# Patient Record
Sex: FEMALE | Race: WHITE | NOT HISPANIC OR LATINO | URBAN - METROPOLITAN AREA
[De-identification: names, ages, dates, MRNs, and addresses within clinical notes are randomized per-mention and may not be internally consistent; named-entity substitution may affect disease eponyms.]

---

## 2017-04-07 ENCOUNTER — TELEPHONE (OUTPATIENT)
Dept: OTHER | Facility: CLINIC | Age: 57
End: 2017-04-07

## 2017-04-07 NOTE — TELEPHONE ENCOUNTER
4/7/2017    Call Regarding Onboarding are Choices     Attempt 1    Message No answer    Comments: DEP- 1      Outreach   CC

## 2017-04-12 NOTE — TELEPHONE ENCOUNTER
Call Regarding Onboarding ARE CHOICES    Attempt 1    Message with spouse    Comments:       Outreach   Denisse Elias

## 2017-07-29 ENCOUNTER — HEALTH MAINTENANCE LETTER (OUTPATIENT)
Age: 57
End: 2017-07-29

## 2024-05-09 ENCOUNTER — VIRTUAL VISIT (OUTPATIENT)
Dept: FAMILY MEDICINE | Facility: CLINIC | Age: 64
End: 2024-05-09
Payer: COMMERCIAL

## 2024-05-09 ENCOUNTER — NURSE TRIAGE (OUTPATIENT)
Dept: NURSING | Facility: CLINIC | Age: 64
End: 2024-05-09
Payer: COMMERCIAL

## 2024-05-09 DIAGNOSIS — Z12.11 SCREEN FOR COLON CANCER: ICD-10-CM

## 2024-05-09 DIAGNOSIS — N39.0 ACUTE UTI (URINARY TRACT INFECTION): Primary | ICD-10-CM

## 2024-05-09 DIAGNOSIS — Z12.31 VISIT FOR SCREENING MAMMOGRAM: ICD-10-CM

## 2024-05-09 PROCEDURE — 99203 OFFICE O/P NEW LOW 30 MIN: CPT | Mod: 95 | Performed by: FAMILY MEDICINE

## 2024-05-09 RX ORDER — NITROFURANTOIN 25; 75 MG/1; MG/1
100 CAPSULE ORAL 2 TIMES DAILY
Qty: 10 CAPSULE | Refills: 0 | Status: SHIPPED | OUTPATIENT
Start: 2024-05-09 | End: 2024-05-14

## 2024-05-09 NOTE — PROGRESS NOTES
Lizbeth is a 63 year old who is being evaluated via a billable video visit.    How would you like to obtain your AVS? MyChart  If the video visit is dropped, the invitation should be resent by: Text to cell phone: [phone]  Will anyone else be joining your video visit? No      Acute UTI (urinary tract infection)  Patient new to this provider in clinic.  Does not have an establish primary care doctor.  She does see any doctors in years.  She reports having had UTI symptoms a year ago and was treated had resolved symptoms.  No labs or urine sample was obtained at that point.  She has several collections that improved with antibiotics.  She denies any fever or abdominal pain or back pain..  Agreed to give her antibiotic.  She reports that she does not react well to penicillin.  Agrees to take Macrobid.  Increase fluids frequent bladder emptying.  UTI bladder hygiene reviewed.  Follow-up if not better in person.  - nitroFURantoin macrocrystal-monohydrate (MACROBID) 100 MG capsule; Take 1 capsule (100 mg) by mouth 2 times daily for 5 days  Dispense: 10 capsule; Refill: 0    Strongly advise establishing care with a primary care provider and doing her wellness visit./Preventative care.    Subjective   Lizbeth is a 63 year old, presenting for the following health issues:  UTI    History of Present Illness       Reason for visit:  UTI  Symptom onset:  1-3 days ago  Symptoms include:  Burning sensation when urinating  Symptom intensity:  Mild  Symptom progression:  Staying the same  Had these symptoms before:  Yes      She hernandez not have a doctor and does not go to doctor at all  She does not really want  No irration/lesion  No blood in urine    Last summer UTI and felt similar  Medication improved it  No abdominal pain, no fever  No           Review of Systems  Constitutional, HEENT, cardiovascular, pulmonary, GI, , musculoskeletal, neuro, skin, endocrine and psych systems are negative, except as otherwise noted.       Objective           Vitals:  No vitals were obtained today due to virtual visit.    Physical Exam   GENERAL: alert and no distress  EYES: Eyes grossly normal to inspection.  No discharge or erythema, or obvious scleral/conjunctival abnormalities.  RESP: No audible wheeze, cough, or visible cyanosis.    SKIN: Visible skin clear. No significant rash, abnormal pigmentation or lesions.  NEURO: Cranial nerves grossly intact.  Mentation and speech appropriate for age.  PSYCH: Appropriate affect, tone, and pace of words      Video-Visit Details    Type of service:  Video Visit   Originating Location (pt. Location): Home    Distant Location (provider location):  On-site  Platform used for Video Visit: Henry  Signed Electronically by: Marie Gallego DO

## 2024-05-09 NOTE — TELEPHONE ENCOUNTER
Patient calling. She's noticed for the past 2-3 days that she has a burning sensation with urination. She had a UTI last summer, and it feels about the same. No hematuria. Afebrile. She feels that she empties her bladder, no frequency.     Care advice given for patient to be seen within 24 hours. Patient states a preference for a virtual urgent care visit. The call was transferred to scheduling to assist with this.     Dona Chilel RN  [facility]  May 9, 2024, 5:28 AM      Reason for Disposition   Age > 50 years    Additional Information   Negative: Shock suspected (e.g., cold/pale/clammy skin, too weak to stand, low BP, rapid pulse)   Negative: Sounds like a life-threatening emergency to the triager   Negative: Followed a genital area injury (e.g., vagina, vulva)   Negative: Taking antibiotic for urinary tract infection (UTI)   Negative: Pregnant   Negative: Postpartum (from 0 to 6 weeks after delivery)   Negative: [1] Unable to urinate (or only a few drops) > 4 hours AND [2] bladder feels very full (e.g., palpable bladder or strong urge to urinate)   Negative: Vomiting   Negative: Patient sounds very sick or weak to the triager   Negative: [1] SEVERE pain with urination (e.g., excruciating) AND [2] not improved after 2 hours of pain medicine and Sitz bath   Negative: Fever > 100.4 F (38.0 C)   Negative: Side (flank) or lower back pain present   Negative: Diabetes mellitus or weak immune system (e.g., HIV positive, cancer chemo, splenectomy, organ transplant, chronic steroids)   Negative: Bedridden (e.g., CVA, chronic illness, recovering from surgery)   Negative: Artificial heart valve or artificial joint   Negative: Unusual vaginal discharge (e.g., bad smelling, yellow, green, or foamy-white)   Negative: Patient is worried they have a sexually transmitted infection (STI)   Negative: Possibility of pregnancy   Negative: Blood in urine (red, pink, or tea-colored)    Protocols used: Urination Pain -  Female-A-AH

## 2024-06-22 ENCOUNTER — HEALTH MAINTENANCE LETTER (OUTPATIENT)
Age: 64
End: 2024-06-22

## 2024-07-31 ENCOUNTER — OFFICE VISIT (OUTPATIENT)
Dept: URGENT CARE | Facility: URGENT CARE | Age: 64
End: 2024-07-31
Payer: COMMERCIAL

## 2024-07-31 VITALS
WEIGHT: 265 LBS | DIASTOLIC BLOOD PRESSURE: 105 MMHG | TEMPERATURE: 98.1 F | SYSTOLIC BLOOD PRESSURE: 150 MMHG | OXYGEN SATURATION: 100 % | RESPIRATION RATE: 16 BRPM | HEART RATE: 86 BPM

## 2024-07-31 DIAGNOSIS — N76.0 BV (BACTERIAL VAGINOSIS): ICD-10-CM

## 2024-07-31 DIAGNOSIS — B96.89 BV (BACTERIAL VAGINOSIS): ICD-10-CM

## 2024-07-31 DIAGNOSIS — N30.00 ACUTE CYSTITIS WITHOUT HEMATURIA: ICD-10-CM

## 2024-07-31 DIAGNOSIS — N30.00 ACUTE CYSTITIS WITHOUT HEMATURIA: Primary | ICD-10-CM

## 2024-07-31 DIAGNOSIS — R30.0 DYSURIA: ICD-10-CM

## 2024-07-31 LAB
ALBUMIN UR-MCNC: >=300 MG/DL
APPEARANCE UR: ABNORMAL
BACTERIA #/AREA URNS HPF: ABNORMAL /HPF
BILIRUB UR QL STRIP: NEGATIVE
CLUE CELLS: PRESENT
COLOR UR AUTO: YELLOW
GLUCOSE UR STRIP-MCNC: NEGATIVE MG/DL
HGB UR QL STRIP: ABNORMAL
KETONES UR STRIP-MCNC: ABNORMAL MG/DL
LEUKOCYTE ESTERASE UR QL STRIP: ABNORMAL
NITRATE UR QL: NEGATIVE
PH UR STRIP: 6 [PH] (ref 5–7)
RBC #/AREA URNS AUTO: ABNORMAL /HPF
SP GR UR STRIP: >=1.03 (ref 1–1.03)
SQUAMOUS #/AREA URNS AUTO: ABNORMAL /LPF
TRICHOMONAS, WET PREP: ABNORMAL
UROBILINOGEN UR STRIP-ACNC: 0.2 E.U./DL
WBC #/AREA URNS AUTO: >100 /HPF
WBC CLUMPS #/AREA URNS HPF: PRESENT /HPF
WBC'S/HIGH POWER FIELD, WET PREP: ABNORMAL
YEAST, WET PREP: ABNORMAL

## 2024-07-31 PROCEDURE — 87086 URINE CULTURE/COLONY COUNT: CPT | Performed by: STUDENT IN AN ORGANIZED HEALTH CARE EDUCATION/TRAINING PROGRAM

## 2024-07-31 PROCEDURE — 99213 OFFICE O/P EST LOW 20 MIN: CPT | Performed by: STUDENT IN AN ORGANIZED HEALTH CARE EDUCATION/TRAINING PROGRAM

## 2024-07-31 PROCEDURE — 81001 URINALYSIS AUTO W/SCOPE: CPT | Performed by: STUDENT IN AN ORGANIZED HEALTH CARE EDUCATION/TRAINING PROGRAM

## 2024-07-31 PROCEDURE — 87210 SMEAR WET MOUNT SALINE/INK: CPT | Performed by: STUDENT IN AN ORGANIZED HEALTH CARE EDUCATION/TRAINING PROGRAM

## 2024-07-31 RX ORDER — PHENAZOPYRIDINE HYDROCHLORIDE 100 MG/1
100 TABLET, FILM COATED ORAL 3 TIMES DAILY PRN
Qty: 6 TABLET | Refills: 0 | Status: SHIPPED | OUTPATIENT
Start: 2024-07-31

## 2024-07-31 RX ORDER — NITROFURANTOIN 25; 75 MG/1; MG/1
CAPSULE ORAL
Qty: 10 CAPSULE | Refills: 0 | OUTPATIENT
Start: 2024-07-31

## 2024-07-31 RX ORDER — METRONIDAZOLE 500 MG/1
TABLET ORAL
Qty: 14 TABLET | Refills: 0 | OUTPATIENT
Start: 2024-07-31

## 2024-07-31 RX ORDER — NITROFURANTOIN 25; 75 MG/1; MG/1
100 CAPSULE ORAL 2 TIMES DAILY
Qty: 10 CAPSULE | Refills: 0 | Status: SHIPPED | OUTPATIENT
Start: 2024-07-31 | End: 2024-08-05

## 2024-07-31 RX ORDER — METRONIDAZOLE 500 MG/1
500 TABLET ORAL 2 TIMES DAILY
Qty: 14 TABLET | Refills: 0 | Status: SHIPPED | OUTPATIENT
Start: 2024-07-31 | End: 2024-08-07

## 2024-07-31 NOTE — PATIENT INSTRUCTIONS
Macrobid for UTI.  Urine culture is pending - you will be notified if we need to change medication.    Metronidazole for BV.  You currently have a vaginal infection called bacterial vaginosis, or BV for short.   This is not a sexually transmitted infection and it cannot be transmitted to your partner.  BV typically occurs due to a change in pH balance of the vagina. The following things may cause BV to occur: douching, new soaps or lubricants, or oral sex. Sometimes we can't prevent BV because it can happen for no reason at all.   Sometimes BV is asymptomatic, but classically it causes thin or creamy odorous vaginal discharge. It can also cause some low abdominal discomfort.     Today we will treat this infection with an antibiotic called Metronidazole (Flagyl). Take this medication twice daily for 7 days. You must avoid drinking alcohol while you are taking this medication. If you drink while taking Flagyl you may experience severe headache, nausea/vomiting, or liver dysfunction.  Avoid sexual intercourse until treatment is completed.  Follow up if you continue to have symptoms after you have completed your treatment.

## 2024-07-31 NOTE — TELEPHONE ENCOUNTER
Medication Question or Refill    Contacts       Contact Date/Time Type Contact Phone/Fax    07/31/2024 03:52 PM CDT Interface (Incoming) [id number] [address] AT [facility] OF [facility] [address] [phone]    07/31/2024 03:59 PM CDT Phone (Incoming) Lizbeth Moody (Self) [phone] (H)            What medication are you calling about (include dose and sig)?: phenazopyridine (PYRIDIUM) 100 MG tablet    Preferred Pharmacy:       [facility] #[id number] - [facility] [address] AT [address] [facility] [address]  Phone: [phone] Fax: [phone]      Controlled Substance Agreement on file:   CSA -- Patient Level:    CSA: None found at the patient level.       Who prescribed the medication?: Nini Bhatti PA-C    Do you need a refill? No, pt was seen today.    When did you use the medication last? N/A    Patient offered an appointment? No    Do you have any questions or concerns?  No      Could we send this information to you in [facility] or would you prefer to receive a phone call?:   Patient would prefer a phone call   Okay to leave a detailed message?: Yes at Cell number on file:    Telephone Information:   Mobile [phone]

## 2024-07-31 NOTE — PROGRESS NOTES
ASSESSMENT & PLAN:   Diagnoses and all orders for this visit:  Acute cystitis without hematuria  -     nitroFURantoin macrocrystal-monohydrate (MACROBID) 100 MG capsule; Take 1 capsule (100 mg) by mouth 2 times daily for 5 days  -     phenazopyridine (PYRIDIUM) 100 MG tablet; Take 1 tablet (100 mg) by mouth 3 times daily as needed for urinary tract discomfort or pain  BV (bacterial vaginosis)  -     metroNIDAZOLE (FLAGYL) 500 MG tablet; Take 1 tablet (500 mg) by mouth 2 times daily for 7 days  Dysuria  -     UA Macroscopic with reflex to Microscopic and Culture - Clinic Collect  -     Wet prep - lab collect; Future  -     UA Microscopic with Reflex to Culture  -     Urine Culture    UTI symptoms x 2 days. UA positive for UTI with pyuria and leukocytes- Macrobid x 5 days. Urine culture pending. Wet prep positive for BV - metronidazole x 7 days.     At the end of the encounter, I discussed results, diagnosis, medications. Discussed red flags for immediate return to clinic/ER, as well as indications for follow up if no improvement. Patient and/or caregiver understood and agreed to plan. Patient was stable for discharge.    Patient Instructions   Macrobid for UTI.  Urine culture is pending - you will be notified if we need to change medication.    Metronidazole for BV.  You currently have a vaginal infection called bacterial vaginosis, or BV for short.   This is not a sexually transmitted infection and it cannot be transmitted to your partner.  BV typically occurs due to a change in pH balance of the vagina. The following things may cause BV to occur: douching, new soaps or lubricants, or oral sex. Sometimes we can't prevent BV because it can happen for no reason at all.   Sometimes BV is asymptomatic, but classically it causes thin or creamy odorous vaginal discharge. It can also cause some low abdominal discomfort.     Today we will treat this infection with an antibiotic called Metronidazole (Flagyl). Take this  medication twice daily for 7 days. You must avoid drinking alcohol while you are taking this medication. If you drink while taking Flagyl you may experience severe headache, nausea/vomiting, or liver dysfunction.  Avoid sexual intercourse until treatment is completed.  Follow up if you continue to have symptoms after you have completed your treatment.      No follow-ups on file.    ------------------------------------------------------------------------  SUBJECTIVE  History was obtained from patient.    Patient presents with:  UTI: Onset: 7/27: Pt has suspected UTI sharp/throbbing pain, burning, frequency, urgency.     HPI  Lizbeth Moody is a(n) 63 year old female presenting to urgent care for UTI symptoms x 2 days. Worsening since onset. Reports dysuria, frequency, urgency. Denies vaginal symptoms including discharge, itching, pain.     Review of Systems    Current Outpatient Medications   Medication Sig Dispense Refill    metroNIDAZOLE (FLAGYL) 500 MG tablet Take 1 tablet (500 mg) by mouth 2 times daily for 7 days 14 tablet 0    nitroFURantoin macrocrystal-monohydrate (MACROBID) 100 MG capsule Take 1 capsule (100 mg) by mouth 2 times daily for 5 days 10 capsule 0    phenazopyridine (PYRIDIUM) 100 MG tablet Take 1 tablet (100 mg) by mouth 3 times daily as needed for urinary tract discomfort or pain 6 tablet 0     Problem List:  There are no relevant problems documented for this patient.    Allergies   Allergen Reactions    Hydrocortisone Swelling    Penicillins Hives         OBJECTIVE  Vitals:    07/31/24 1212   BP: (!) 150/105   BP Location: Left arm   Patient Position: Sitting   Cuff Size: Adult Regular   Pulse: 86   Resp: 16   Temp: 98.1  F (36.7  C)   TempSrc: Tympanic   SpO2: 100%   Weight: 120.2 kg (265 lb)     Physical Exam   GENERAL: healthy, alert, no acute distress.   PSYCH: mentation appears normal. Normal affect    Results for orders placed or performed in visit on 07/31/24   UA Macroscopic  with reflex to Microscopic and Culture - Clinic Collect     Status: Abnormal    Specimen: Urine, Clean Catch   Result Value Ref Range    Color Urine Yellow Colorless, Straw, Light Yellow, Yellow    Appearance Urine Slightly Cloudy (A) Clear    Glucose Urine Negative Negative mg/dL    Bilirubin Urine Negative Negative    Ketones Urine Trace (A) Negative mg/dL    Specific Gravity Urine >=1.030 1.003 - 1.035    Blood Urine Large (A) Negative    pH Urine 6.0 5.0 - 7.0    Protein Albumin Urine >=300 (A) Negative mg/dL    Urobilinogen Urine 0.2 0.2, 1.0 E.U./dL    Nitrite Urine Negative Negative    Leukocyte Esterase Urine Trace (A) Negative   UA Microscopic with Reflex to Culture     Status: Abnormal   Result Value Ref Range    Bacteria Urine Many (A) None Seen /HPF    RBC Urine 25-50 (A) 0-2 /HPF /HPF    WBC Urine >100 (A) 0-5 /HPF /HPF    Squamous Epithelials Urine Many (A) None Seen /LPF    WBC Clumps Urine Present (A) None Seen /HPF   Wet prep - lab collect     Status: Abnormal    Specimen: Vagina; Swab   Result Value Ref Range    Trichomonas Absent Absent    Yeast Absent Absent    Clue Cells Present (A) Absent    WBCs/high power field 1+ (A) None

## 2024-08-02 LAB — BACTERIA UR CULT: NORMAL

## 2024-11-09 ENCOUNTER — HEALTH MAINTENANCE LETTER (OUTPATIENT)
Age: 64
End: 2024-11-09

## 2024-12-12 ENCOUNTER — VIRTUAL VISIT (OUTPATIENT)
Dept: FAMILY MEDICINE | Facility: CLINIC | Age: 64
End: 2024-12-12
Payer: COMMERCIAL

## 2024-12-12 DIAGNOSIS — N39.0 URINARY TRACT INFECTION WITHOUT HEMATURIA, SITE UNSPECIFIED: Primary | ICD-10-CM

## 2024-12-12 RX ORDER — NITROFURANTOIN 25; 75 MG/1; MG/1
100 CAPSULE ORAL 2 TIMES DAILY
Qty: 10 CAPSULE | Refills: 0 | Status: SHIPPED | OUTPATIENT
Start: 2024-12-12 | End: 2024-12-17

## 2024-12-12 NOTE — PROGRESS NOTES
Lizbeth is a 64 year old who is being evaluated via a billable video visit.    How would you like to obtain your AVS? MyChart  If the video visit is dropped, the invitation should be resent by: Text to cell phone: [phone]  Will anyone else be joining your video visit? No      Assessment & Plan     Urinary tract infection without hematuria, site unspecified  Declined urine sample to be dropped off as she has transportation problems.  Patient has had 3 visits now for UTI, and is strongly advised to not do virtual visits any more for UTI. She should come in person and get labs and urine..  Does not have an establish primary care doctor.  She reports of feeling better with antibiotics .  She denies any fever or abdominal pain or back pain..  Agreed to give her antibiotic.  She reports that she does not react well to penicillin.  Agrees to take Macrobid.  Increase fluids frequent bladder emptying.   - nitroFURantoin macrocrystal-monohydrate (MACROBID) 100 MG capsule; Take 1 capsule (100 mg) by mouth 2 times daily for 5 days.          Nicotine/Tobacco Cessation  She reports that she has been smoking cigarettes. She does not have any smokeless tobacco history on file.  Nicotine/Tobacco Cessation Plan  Information offered: Patient not interested at this time        See Patient Instructions    Subjective   Lizbeth is a 64 year old, presenting for the following health issues:  UTI      12/12/2024     8:01 AM   Additional Questions   Roomed by Kathie     UTI    History of Present Illness       Reason for visit:  Uti  Symptom onset:  1-3 days ago  Symptoms include:  Pain when uterinating can hold any unless painful  Symptom intensity:  Severe  Symptom progression:  Staying the same  Had these symptoms before:  Yes  Has tried/received treatment for these symptoms:  Yes  Previous treatment was successful:  Yes  Prior treatment description:  Medication as prescribed  What makes it worse:  No  What makes it better:  No    She is taking medications regularly.           Review of Systems  Constitutional, HEENT, cardiovascular, pulmonary, GI, , musculoskeletal, neuro, skin, endocrine and psych systems are negative, except as otherwise noted.      Objective           Vitals:  No vitals were obtained today due to virtual visit.    Physical Exam   GENERAL: alert and no distress  EYES: Eyes grossly normal to inspection.  No discharge or erythema, or obvious scleral/conjunctival abnormalities.  RESP: No audible wheeze, cough, or visible cyanosis.    SKIN: Visible skin clear. No significant rash, abnormal pigmentation or lesions.  NEURO: Cranial nerves grossly intact.  Mentation and speech appropriate for age.  PSYCH: Appropriate affect, tone, and pace of words          Video-Visit Details    Type of service:  Video Visit   Originating Location (pt. Location): Home    Distant Location (provider location):  On-site  Platform used for Video Visit: Henry  Signed Electronically by: Marie Gallego DO

## 2024-12-16 ENCOUNTER — OFFICE VISIT (OUTPATIENT)
Dept: FAMILY MEDICINE | Facility: CLINIC | Age: 64
End: 2024-12-16
Payer: COMMERCIAL

## 2024-12-16 VITALS
HEIGHT: 65 IN | DIASTOLIC BLOOD PRESSURE: 84 MMHG | TEMPERATURE: 97.3 F | SYSTOLIC BLOOD PRESSURE: 133 MMHG | OXYGEN SATURATION: 97 % | HEART RATE: 83 BPM | WEIGHT: 269.13 LBS | RESPIRATION RATE: 20 BRPM | BODY MASS INDEX: 44.84 KG/M2

## 2024-12-16 DIAGNOSIS — R30.0 DYSURIA: ICD-10-CM

## 2024-12-16 DIAGNOSIS — N39.0 ACUTE UTI (URINARY TRACT INFECTION): Primary | ICD-10-CM

## 2024-12-16 LAB
ALBUMIN UR-MCNC: 100 MG/DL
APPEARANCE UR: ABNORMAL
BACTERIA #/AREA URNS HPF: ABNORMAL /HPF
BILIRUB UR QL STRIP: ABNORMAL
COLOR UR AUTO: ABNORMAL
GLUCOSE UR STRIP-MCNC: NEGATIVE MG/DL
HGB UR QL STRIP: ABNORMAL
KETONES UR STRIP-MCNC: ABNORMAL MG/DL
LEUKOCYTE ESTERASE UR QL STRIP: ABNORMAL
NITRATE UR QL: NEGATIVE
PH UR STRIP: 5.5 [PH] (ref 5–7)
RBC #/AREA URNS AUTO: ABNORMAL /HPF
SP GR UR STRIP: >=1.03 (ref 1–1.03)
SQUAMOUS #/AREA URNS AUTO: ABNORMAL /LPF
UROBILINOGEN UR STRIP-ACNC: 0.2 E.U./DL
WBC #/AREA URNS AUTO: ABNORMAL /HPF

## 2024-12-16 PROCEDURE — 99213 OFFICE O/P EST LOW 20 MIN: CPT | Performed by: PHYSICIAN ASSISTANT

## 2024-12-16 PROCEDURE — 87086 URINE CULTURE/COLONY COUNT: CPT | Performed by: PHYSICIAN ASSISTANT

## 2024-12-16 PROCEDURE — 81001 URINALYSIS AUTO W/SCOPE: CPT | Performed by: PHYSICIAN ASSISTANT

## 2024-12-16 RX ORDER — CIPROFLOXACIN 250 MG/1
250 TABLET, FILM COATED ORAL 2 TIMES DAILY
Qty: 10 TABLET | Refills: 0 | Status: SHIPPED | OUTPATIENT
Start: 2024-12-16 | End: 2024-12-21

## 2024-12-16 RX ORDER — PHENAZOPYRIDINE HYDROCHLORIDE 100 MG/1
100 TABLET, FILM COATED ORAL 3 TIMES DAILY PRN
Qty: 6 TABLET | Refills: 0 | Status: SHIPPED | OUTPATIENT
Start: 2024-12-16

## 2024-12-16 RX ORDER — PHENAZOPYRIDINE HYDROCHLORIDE 100 MG/1
100 TABLET, FILM COATED ORAL 3 TIMES DAILY PRN
Qty: 6 TABLET | Refills: 0 | Status: SHIPPED | OUTPATIENT
Start: 2024-12-16 | End: 2024-12-16

## 2024-12-16 ASSESSMENT — PAIN SCALES - GENERAL: PAINLEVEL_OUTOF10: SEVERE PAIN (7)

## 2024-12-16 NOTE — PATIENT INSTRUCTIONS
Urinary Tract Infection (UTI) in Women: Care Instructions  Overview     A urinary tract infection (UTI) is an infection caused by bacteria. It can happen anywhere in the urinary tract. A UTI can happen in the:  Kidneys.  Ureters, the tubes that connect the kidneys to the bladder.  Bladder.  Urethra, where the urine comes out.  Most UTIs are bladder infections. They often cause pain or burning when you urinate.  Most UTIs can be cured with antibiotics. If you are prescribed antibiotics, be sure to complete your treatment so that the infection does not get worse.  Follow-up care is a key part of your treatment and safety. Be sure to make and go to all appointments, and call your doctor if you are having problems. It's also a good idea to know your test results and keep a list of the medicines you take.  How can you care for yourself at home?  Take your antibiotics as directed. Do not stop taking them just because you feel better. You need to take the full course of antibiotics.  Drink extra water and other fluids for the next day or two. This will help make the urine less concentrated and help wash out the bacteria that are causing the infection. (If you have kidney, heart, or liver disease and have to limit fluids, talk with your doctor before you increase the amount of fluids you drink.)  Avoid drinks that are carbonated or have caffeine. They can irritate the bladder.  Urinate often. Try to empty your bladder each time.  To relieve pain, take a hot bath or lay a heating pad set on low over your lower belly or genital area. Never go to sleep with a heating pad in place.  To prevent UTIs  Drink plenty of water each day. This helps you urinate often, which clears bacteria from your system. (If you have kidney, heart, or liver disease and have to limit fluids, talk with your doctor before you increase the amount of fluids you drink.)  Urinate when you need to.  If you are sexually active, urinate right after you have  "sex.  Change sanitary pads often.  Avoid douches, bubble baths, feminine hygiene sprays, and other feminine hygiene products that have deodorants.  After going to the bathroom, wipe from front to back.  When should you call for help?   Call your doctor now or seek immediate medical care if:    You have new or worse fever, chills, nausea, or vomiting.     You have new pain in your back just below your rib cage. This is called flank pain.     There is new blood or pus in your urine.     You have any problems with your antibiotic medicine.   Watch closely for changes in your health, and be sure to contact your doctor if:    You are not getting better after taking an antibiotic for 2 days.     Your symptoms go away but then come back.   Where can you learn more?  Go to https://www.[employer].net/patiented  Enter K848 in the search box to learn more about \"Urinary Tract Infection (UTI) in Women: Care Instructions.\"  Current as of: November 15, 2023  Content Version: 14.2 2024 [facility] [employer].   Care instructions adapted under license by your healthcare professional. If you have questions about a medical condition or this instruction, always ask your healthcare professional. [employer] disclaims any warranty or liability for your use of this information.    "

## 2024-12-16 NOTE — PROGRESS NOTES
"  Assessment & Plan     Acute UTI (urinary tract infection)  Urinalysis shows UTI. Antibiotics recommended to treat. Due to no improvement with Macrobid will switch antibiotic to Ciprofloxacin. Side effects discussed. In addition I recommend increase fluids, otc pain relievers as needed or AZO PRN. Culture is pending. Follow up in clinic if symptoms are not improving. Patient agree's with this plan and has no further questions   - ciprofloxacin (CIPRO) 250 MG tablet; Take 1 tablet (250 mg) by mouth 2 times daily for 5 days.    Dysuria  - UA with Microscopic reflex to Culture - lab collect; Future  - UA with Microscopic reflex to Culture - lab collect  - UA Microscopic with Reflex to Culture  - Urine Culture  - phenazopyridine (PYRIDIUM) 100 MG tablet; Take 1 tablet (100 mg) by mouth 3 times daily as needed for urinary tract discomfort or pain.          BMI  Estimated body mass index is 44.62 kg/m  as calculated from the following:    Height as of this encounter: 1.654 m (5' 5.12\").    Weight as of this encounter: 122.1 kg (269 lb 2 oz).             Subjective   Lizbeth is a 64 year old, presenting for the following health issues:  UTI    History of Present Illness       Reason for visit:  Uti  Symptom onset:  1-3 days ago  Symptoms include:  Pain when uterinating can hold any unless painful  Symptom intensity:  Severe  Symptom progression:  Staying the same  Had these symptoms before:  Yes  Has tried/received treatment for these symptoms:  Yes  Previous treatment was successful:  Yes  Prior treatment description:  Medication as prescribed  What makes it worse:  No  What makes it better:  No   She is taking medications regularly.           Genitourinary - Female  Onset/Duration: 7 days  Description:   Painful urination (Dysuria): YES - burning            Frequency: YES  Blood in urine (Hematuria): No  Delay in urine (Hesitency): No  Intensity: moderate  Progression of Symptoms:  worsening  Accompanying Signs & " "Symptoms:  Fever/chills: No  Flank pain: No  Nausea and vomiting: No  Vaginal symptoms: none  Abdominal/Pelvic Pain: No  History:   History of frequent UTI s: YES- she has gotten about 4 UTI's this year  History of kidney stones: No  Sexually Active: YES - not concerned for STD's  Possibility of pregnancy: No  Precipitating or alleviating factors: None  Therapies tried and outcome was placed on antibiotics Macrobid for 4 days but not working, Aleve       Review of Systems  Constitutional, HEENT, cardiovascular, pulmonary, gi and gu systems are negative, except as otherwise noted.      Objective    /84   Pulse 83   Temp 97.3  F (36.3  C) (Oral)   Resp 20   Ht 1.654 m (5' 5.12\")   Wt 122.1 kg (269 lb 2 oz)   LMP  (LMP Unknown)   SpO2 97%   BMI 44.62 kg/m    Body mass index is 44.62 kg/m .  Physical Exam  Constitutional:       Appearance: Normal appearance.   HENT:      Head: Normocephalic.   Cardiovascular:      Rate and Rhythm: Normal rate and regular rhythm.   Pulmonary:      Effort: Pulmonary effort is normal.      Breath sounds: Normal breath sounds.   Abdominal:      General: Bowel sounds are normal.      Palpations: Abdomen is soft.      Tenderness: There is no abdominal tenderness. There is no right CVA tenderness or left CVA tenderness.   Neurological:      Mental Status: She is alert.   Psychiatric:         Mood and Affect: Mood normal.            Results for orders placed or performed in visit on 12/16/24   UA with Microscopic reflex to Culture - lab collect     Status: Abnormal    Specimen: Urine, Midstream   Result Value Ref Range    Color Urine Dona (A) Colorless, Straw, Light Yellow, Yellow    Appearance Urine Cloudy (A) Clear    Glucose Urine Negative Negative mg/dL    Bilirubin Urine Small (A) Negative    Ketones Urine Trace (A) Negative mg/dL    Specific Gravity Urine >=1.030 1.003 - 1.035    Blood Urine Moderate (A) Negative    pH Urine 5.5 5.0 - 7.0    Protein Albumin Urine 100 (A) " Negative mg/dL    Urobilinogen Urine 0.2 0.2, 1.0 E.U./dL    Nitrite Urine Negative Negative    Leukocyte Esterase Urine Small (A) Negative   UA Microscopic with Reflex to Culture     Status: Abnormal   Result Value Ref Range    Bacteria Urine Moderate (A) None Seen /HPF    RBC Urine 10-25 (A) 0-2 /HPF /HPF    WBC Urine 25-50 (A) 0-5 /HPF /HPF    Squamous Epithelials Urine Moderate (A) None Seen /LPF           Signed Electronically by: NISHANT Vail

## 2024-12-17 ENCOUNTER — NURSE TRIAGE (OUTPATIENT)
Dept: NURSING | Facility: CLINIC | Age: 64
End: 2024-12-17
Payer: COMMERCIAL

## 2024-12-17 ENCOUNTER — TELEPHONE (OUTPATIENT)
Dept: FAMILY MEDICINE | Facility: CLINIC | Age: 64
End: 2024-12-17
Payer: COMMERCIAL

## 2024-12-17 DIAGNOSIS — R30.0 DYSURIA: Primary | ICD-10-CM

## 2024-12-17 LAB — BACTERIA UR CULT: NORMAL

## 2024-12-17 NOTE — TELEPHONE ENCOUNTER
Called and left message for patient to return call to clinic    NORMA Elias    Triage Nurse  [facility]        ----- Message from Karyna Del Castillo sent at 12/17/2024  2:54 PM CST -----  Please call patient and inform her that her urine culture did not come back positive for bacteria. Meaning this is not a UTI. However, I would complete the full course of antibiotics but we need to keep a close on eye on her urine and symptoms. We need to recheck urine in 2 weeks to ensure urine goes back to normal.   Please make follow up appointment in 2 weeks to recheck     Thank you,  Karyna Del Castillo PA-C

## 2024-12-18 NOTE — TELEPHONE ENCOUNTER
Message to call a triage nurse. Seen in office yesterday am and tests were done for UTI.   Message from provider was read to patient.   She needs follow up appointment in 2 weeks to recheck her urine--call transferred to .     Andree Barrow RN Triage Nurse Advisor 7:09 PM 12/17/2024  Reason for Disposition   Lab result questions   [1] Other NON-URGENT information for PCP AND [2] does not require PCP response    Additional Information   Negative: Lab calling with strep throat test results and triager can call in prescription   Negative: Lab calling with urinalysis test results and triager can call in prescription   Negative: Medication questions   Negative: Medication renewal and refill questions   Negative: Pre-operative or pre-procedural questions   Negative: ED call to PCP (i.e., primary care provider; doctor, NP, or PA)   Negative: Doctor (or NP/PA) call to PCP   Negative: Call about patient who is currently hospitalized   Negative: Lab or radiology calling with CRITICAL test results   Negative: [1] Follow-up call from patient regarding patient's clinical status AND [2] information urgent   Negative: [1] Caller requests to speak ONLY to PCP AND [2] URGENT question   Negative: [1] Caller requests to speak to PCP now AND [2] won't tell us reason for call  (Exception: If 10 pm to 6 am, caller must first discuss reason for the call.)   Negative: Notification of hospital admission   Negative: Notification of death   Negative: Caller requesting lab results  (Exception: Routine or non-urgent lab result.)   Negative: Lab or radiology calling with test results   Negative: [1] Follow-up call from patient regarding patient's clinical status AND [2] information NON-URGENT   Negative: [1] Caller requests to speak ONLY to PCP AND [2] NON-URGENT question   Negative: Caller requesting an appointment, triage offered and declined   Negative: Caller requesting routine or non-urgent lab result    Protocols used:  Information Only Call - No Triage-A-AH, PCP Call - No Triage-A-AH

## 2025-04-09 ENCOUNTER — LAB (OUTPATIENT)
Dept: LAB | Facility: CLINIC | Age: 65
End: 2025-04-09

## 2025-04-09 ENCOUNTER — VIRTUAL VISIT (OUTPATIENT)
Dept: FAMILY MEDICINE | Facility: CLINIC | Age: 65
End: 2025-04-09

## 2025-04-09 DIAGNOSIS — Z12.11 SCREEN FOR COLON CANCER: ICD-10-CM

## 2025-04-09 DIAGNOSIS — Z12.31 VISIT FOR SCREENING MAMMOGRAM: ICD-10-CM

## 2025-04-09 DIAGNOSIS — Z78.0 ASYMPTOMATIC POSTMENOPAUSAL STATUS: Primary | ICD-10-CM

## 2025-04-09 DIAGNOSIS — R39.9 URINARY SYMPTOM OR SIGN: Primary | ICD-10-CM

## 2025-04-09 DIAGNOSIS — N30.00 ACUTE CYSTITIS WITHOUT HEMATURIA: ICD-10-CM

## 2025-04-09 DIAGNOSIS — R39.9 URINARY SYMPTOM OR SIGN: ICD-10-CM

## 2025-04-09 LAB
BACTERIA #/AREA URNS HPF: ABNORMAL /HPF
RBC #/AREA URNS AUTO: ABNORMAL /HPF
SQUAMOUS #/AREA URNS AUTO: ABNORMAL /LPF
WBC #/AREA URNS AUTO: >100 /HPF

## 2025-04-09 PROCEDURE — 98005 SYNCH AUDIO-VIDEO EST LOW 20: CPT | Performed by: FAMILY MEDICINE

## 2025-04-09 PROCEDURE — 87186 SC STD MICRODIL/AGAR DIL: CPT

## 2025-04-09 PROCEDURE — 81015 MICROSCOPIC EXAM OF URINE: CPT

## 2025-04-09 PROCEDURE — 87086 URINE CULTURE/COLONY COUNT: CPT

## 2025-04-09 RX ORDER — TRAMADOL HYDROCHLORIDE 50 MG/1
50 TABLET ORAL EVERY 6 HOURS PRN
Qty: 6 TABLET | Refills: 0 | Status: SHIPPED | OUTPATIENT
Start: 2025-04-09 | End: 2025-04-12

## 2025-04-09 RX ORDER — NITROFURANTOIN 25; 75 MG/1; MG/1
100 CAPSULE ORAL 2 TIMES DAILY
Qty: 20 CAPSULE | Refills: 0 | Status: SHIPPED | OUTPATIENT
Start: 2025-04-09 | End: 2025-04-19

## 2025-04-09 NOTE — PROGRESS NOTES
"Lizbeth is a 64 year old who is being evaluated via a billable video visit.    How would you like to obtain your AVS? MyChart  If the video visit is dropped, the invitation should be resent by: Send to e-mail at: [email or web address].  Can do cell to cell if needed.  Cell is [phone].  Will anyone else be joining your video visit? No      Assessment & Plan   Problem List Items Addressed This Visit    None  Visit Diagnoses       Asymptomatic postmenopausal status    -  Primary    Visit for screening mammogram        Screen for colon cancer        Acute cystitis without hematuria        Relevant Medications    nitroFURantoin macrocrystal-monohydrate (MACROBID) 100 MG capsule    traMADol (ULTRAM) 50 MG tablet           UA discussed with patient. Antibiotics faxed. Recommend increase in fluids.        BMI  Estimated body mass index is 44.62 kg/m  as calculated from the following:    Height as of 12/16/24: 1.654 m (5' 5.12\").    Weight as of 12/16/24: 122.1 kg (269 lb 2 oz).       FUTURE APPOINTMENTS:       - Follow-up visit as needed      Subjective   Lizbeth is a 64 year old, presenting for the following health issues:  UTI (Symptoms of a UTI.  Pt did go to the lab to have a UA done prior to the visit.)        4/9/2025     5:28 PM   Additional Questions   Roomed by Laura Dunn CMA     HPI    Patient is a 64 yr old female with urinary symptoms. She reports burning and frequency. She has had UTIS in the past and reports they are becoming more frequent,   In the last year she has had about 4-5 UTIS. She is wondering what she can do to prevent them. Recommend making an in person visit with her primary.   She may have vaginal atrophy or she may need a cystoscopy.     Genitourinary - Female  Onset/Duration: Last Saturday.  Description:   Painful urination (Dysuria): YES           Frequency: YES  Blood in urine (Hematuria): No  Delay in urine (Hesitency): No  Intensity: 9.5/10  Progression of Symptoms:  " worsening  Accompanying Signs & Symptoms:  Fever/chills: No  Flank pain: No  Nausea and vomiting: No  Vaginal symptoms: none  Abdominal/Pelvic Pain: No  History:   History of frequent UTI s: YES  History of kidney stones: No  Sexually Active: YES  Possibility of pregnancy: No  Precipitating or alleviating factors: Hot bath with epsom salt to soak.  That will help some.  Ibuprofen as needed-that can bring the pain to a 6/10.  Therapies tried and outcome: AZO helped some but still present-started that on Monday, took the last one today.  Increased fluids.  Patricia-health-that has some Cranberry in it.          Review of Systems  CONSTITUTIONAL: NEGATIVE for fever, chills, change in weight  INTEGUMENTARY/SKIN: NEGATIVE for worrisome rashes, moles or lesions  ENT/MOUTH: NEGATIVE for ear, mouth and throat problems  RESP: NEGATIVE for significant cough or SOB  CV: NEGATIVE for chest pain, palpitations or peripheral edema  : positive for  dysuria, frequency , and urgency  MUSCULOSKELETAL: NEGATIVE for significant arthralgias or myalgia  NEURO: NEGATIVE for weakness, dizziness or paresthesias  ENDOCRINE: NEGATIVE for temperature intolerance, skin/hair changes  HEME/ALLERGY/IMMUNE: NEGATIVE for bleeding problems      Objective    Vitals - Patient Reported  Pain Score: Severe Pain (9)  Pain Loc: Other - see comment (UTI symptoms)      Vitals:  No vitals were obtained today due to virtual visit.    Physical Exam   GENERAL: alert and no distress  EYES: Eyes grossly normal to inspection.  No discharge or erythema, or obvious scleral/conjunctival abnormalities.  RESP: No audible wheeze, cough, or visible cyanosis.    SKIN: Visible skin clear. No significant rash, abnormal pigmentation or lesions.  NEURO: Cranial nerves grossly intact.  Mentation and speech appropriate for age.  PSYCH: Appropriate affect, tone, and pace of words    Results for orders placed or performed in visit on 04/09/25 (from the past 24 hours)   UA Microscopic  with Reflex to Culture   Result Value Ref Range    Bacteria Urine Few (A) None Seen /HPF    RBC Urine  (A) 0-2 /HPF /HPF    WBC Urine >100 (A) 0-5 /HPF /HPF    Squamous Epithelials Urine Moderate (A) None Seen /LPF   Urine Culture    Specimen: Urine, Clean Catch   Result Value Ref Range    Culture 50,000-100,000 CFU/mL Escherichia coli (A)          Video-Visit Details    Type of service:  Video Visit   Originating Location (pt. Location): Home  Distant Location (provider location):  Off-site  Platform used for Video Visit: Henry  Signed Electronically by: Werner Palacios MD

## 2025-04-10 LAB — BACTERIA UR CULT: ABNORMAL

## 2025-04-13 ENCOUNTER — MYC MEDICAL ADVICE (OUTPATIENT)
Dept: FAMILY MEDICINE | Facility: CLINIC | Age: 65
End: 2025-04-13
Payer: MEDICARE

## 2025-04-13 DIAGNOSIS — N39.0 COMPLICATED UTI (URINARY TRACT INFECTION): Primary | ICD-10-CM

## 2025-04-14 NOTE — TELEPHONE ENCOUNTER
Forwarding to treating provider, per patient request.    Patient calls to follow-up to her MyChart regarding ongoing UTI symptoms.   Patient had a virtual visit on 4/9/25 for urinary symptoms of frequency and burning, was found to have UTI and was prescribed Macrobid, then tramadol for pain.     Patient reports taking these medications as directed, but symptoms have barely improved.  She reports continued frequency, and burning that is rated at 9/10 (states it was 12/10 before medications).    It appears the culture returned with E.Coli, which is susceptible to the Macrobid prescribed.   Due to ongoing symptoms and per history, recurring UTIs, RN advised an in-person visit to discuss next steps.  Patient declines, states she already paid for the virtual visit and doesn't want to pay for another visit, although she does have Medicare now.  She also is not established with a PCP.   Forwarding to provider per request.   Do you have any additional recommendations such as change in antibiotic or referral to Urogoly, or should RN reiterate the need for follow-up visit to discuss further?    Chantel Haider RN  [facility]

## 2025-04-15 NOTE — TELEPHONE ENCOUNTER
I am not sure what else to offer this patient.I had given her a week worth of antibiotics, she needs to be seen in person. I can place a referral to see urology urgently .

## 2025-07-22 ENCOUNTER — TELEPHONE (OUTPATIENT)
Dept: FAMILY MEDICINE | Facility: CLINIC | Age: 65
End: 2025-07-22
Payer: MEDICARE

## 2025-07-22 NOTE — TELEPHONE ENCOUNTER
Patient Quality Outreach    Patient is due for the following:   Colon Cancer Screening  Breast Cancer Screening - Mammogram  Physical Annual Wellness Visit      Topic Date Due    Pneumococcal Vaccine (1 of 2 - PCV) Never done    Diptheria Tetanus Pertussis (DTAP/TDAP/TD) Vaccine (1 - Tdap) Never done    Zoster (Shingles) Vaccine (1 of 2) Never done    COVID-19 Vaccine (4 - 2024-25 season) 09/01/2024       Action(s) Taken:   Schedule a Annual Wellness Visit    Type of outreach:    Sent [employer] message.    Questions for provider review:    None         Brenda Blanco CMA  Chart routed to None.